# Patient Record
Sex: MALE | Race: WHITE | URBAN - METROPOLITAN AREA
[De-identification: names, ages, dates, MRNs, and addresses within clinical notes are randomized per-mention and may not be internally consistent; named-entity substitution may affect disease eponyms.]

---

## 2017-10-09 ENCOUNTER — OFFICE VISIT (OUTPATIENT)
Dept: DERMATOLOGY | Age: 62
End: 2017-10-09

## 2017-10-09 DIAGNOSIS — D22.9 MULTIPLE NEVI: Primary | ICD-10-CM

## 2017-10-09 DIAGNOSIS — L57.0 AK (ACTINIC KERATOSIS): ICD-10-CM

## 2017-10-09 PROCEDURE — 99202 OFFICE O/P NEW SF 15 MIN: CPT | Performed by: DERMATOLOGY

## 2017-10-09 PROCEDURE — 17000 DESTRUCT PREMALG LESION: CPT | Performed by: DERMATOLOGY

## 2017-10-09 RX ORDER — HYDROCHLOROTHIAZIDE 25 MG/1
TABLET ORAL
Refills: 1 | COMMUNITY
Start: 2017-10-05

## 2017-10-09 RX ORDER — ATORVASTATIN CALCIUM 10 MG/1
TABLET, FILM COATED ORAL
COMMUNITY
Start: 2017-09-05

## 2017-10-09 RX ORDER — TESTOSTERONE GEL, 1% 10 MG/G
GEL TRANSDERMAL
COMMUNITY
Start: 2017-07-18

## 2017-10-09 RX ORDER — CLONAZEPAM 0.5 MG/1
TABLET ORAL
Refills: 5 | COMMUNITY
Start: 2017-08-20

## 2017-10-09 RX ORDER — ASPIRIN 81 MG/1
81 TABLET ORAL
COMMUNITY
End: 2019-10-22

## 2017-10-09 RX ORDER — AMLODIPINE AND OLMESARTAN MEDOXOMIL 5; 40 MG/1; MG/1
1 TABLET ORAL
COMMUNITY

## 2017-10-09 NOTE — PATIENT INSTRUCTIONS
Protecting Yourself From the Sun    · Apply broad spectrum water resistant sunscreen with an SPF of at least 30 to exposed areas of the skin. Dont forget the ears and lips! Remember to reapply sunscreen about every 2 hours and after swimming or sweating. · Wear sun protective clothing. Swim shirts (aka. rash guards) are a great idea and negates the need to reapply sunscreen in those areas. · Seek the shade whenever possible especially between the hours of 10 am and 4 pm when the suns rays are the strongest.     · Avoid tanning beds        Cryosurgery (Freezing) Wound Care Instructions    AFTER THE PROCEDURE:    You will notice swelling and redness around the site. This is normal.    You may experience a sharp or sore feeling for the next several days. For this discomfort, you may take acetaminophen (Tylenol©).  A blister may develop at the treated area, sometimes as soon as by the end of the day. After several days, the blister will subside and a scab will form.  If the area is bumped or traumatized during the first few days following freezing, you may develop bleeding into the blister, forming a blood blister. This is nothing to be alarmed about.  If the blister is tense, uncomfortable, or much larger than the site that was frozen, you may pop the blister along its edge with a sterile needle (boiled, heated under a flame, or cleaned with alcohol) to allow the fluid to drain out. If the blister does not bother you, no treatment is needed.  Do NOT peel off the top of the blister roof. It will act as a dressing on top of your wound. WOUND CARE:    You may shower or bathe as usual, but avoid scrubbing the areas that have been frozen.  Cleanse the site twice a day with mild soapy water, and then apply a thin film of white petrolatum (Vaseline©).  You do not need to cover the area, but can if you prefer.     Do NOT allow the site to become dry or crusted, or attempt to dry it out with rubbing alcohol or hydrogen peroxide.  Continue this regimen until the area is pink and healed. Depending on the size and location of your cryosurgery site, healing may take 2 to 4 weeks.  The area may continue to be pink for several weeks, and over the next few months may become darker or lighter than the surrounding skin. This may be a permanent change.

## 2018-10-16 ENCOUNTER — OFFICE VISIT (OUTPATIENT)
Dept: DERMATOLOGY | Age: 63
End: 2018-10-16
Payer: COMMERCIAL

## 2018-10-16 DIAGNOSIS — L57.0 AK (ACTINIC KERATOSIS): ICD-10-CM

## 2018-10-16 DIAGNOSIS — D22.9 MULTIPLE NEVI: Primary | ICD-10-CM

## 2018-10-16 PROCEDURE — 99213 OFFICE O/P EST LOW 20 MIN: CPT | Performed by: DERMATOLOGY

## 2018-10-16 PROCEDURE — 17000 DESTRUCT PREMALG LESION: CPT | Performed by: DERMATOLOGY

## 2018-10-16 PROCEDURE — 3017F COLORECTAL CA SCREEN DOC REV: CPT | Performed by: DERMATOLOGY

## 2018-10-16 PROCEDURE — G8484 FLU IMMUNIZE NO ADMIN: HCPCS | Performed by: DERMATOLOGY

## 2018-10-16 PROCEDURE — G8421 BMI NOT CALCULATED: HCPCS | Performed by: DERMATOLOGY

## 2018-10-16 PROCEDURE — G8427 DOCREV CUR MEDS BY ELIG CLIN: HCPCS | Performed by: DERMATOLOGY

## 2018-10-16 PROCEDURE — 1036F TOBACCO NON-USER: CPT | Performed by: DERMATOLOGY

## 2018-10-16 RX ORDER — GABAPENTIN 300 MG/1
300 CAPSULE ORAL 3 TIMES DAILY
COMMUNITY

## 2019-10-22 ENCOUNTER — OFFICE VISIT (OUTPATIENT)
Dept: DERMATOLOGY | Age: 64
End: 2019-10-22
Payer: MEDICARE

## 2019-10-22 DIAGNOSIS — L57.0 AK (ACTINIC KERATOSIS): ICD-10-CM

## 2019-10-22 DIAGNOSIS — D22.9 MULTIPLE NEVI: Primary | ICD-10-CM

## 2019-10-22 PROCEDURE — 17000 DESTRUCT PREMALG LESION: CPT | Performed by: DERMATOLOGY

## 2019-10-22 PROCEDURE — 1036F TOBACCO NON-USER: CPT | Performed by: DERMATOLOGY

## 2019-10-22 PROCEDURE — 99213 OFFICE O/P EST LOW 20 MIN: CPT | Performed by: DERMATOLOGY

## 2019-10-22 PROCEDURE — G8427 DOCREV CUR MEDS BY ELIG CLIN: HCPCS | Performed by: DERMATOLOGY

## 2019-10-22 PROCEDURE — 1123F ACP DISCUSS/DSCN MKR DOCD: CPT | Performed by: DERMATOLOGY

## 2019-10-22 PROCEDURE — 4040F PNEUMOC VAC/ADMIN/RCVD: CPT | Performed by: DERMATOLOGY

## 2019-10-22 PROCEDURE — G8421 BMI NOT CALCULATED: HCPCS | Performed by: DERMATOLOGY

## 2019-10-22 PROCEDURE — 3017F COLORECTAL CA SCREEN DOC REV: CPT | Performed by: DERMATOLOGY

## 2019-10-22 PROCEDURE — G8484 FLU IMMUNIZE NO ADMIN: HCPCS | Performed by: DERMATOLOGY

## 2019-10-22 RX ORDER — AMPICILLIN TRIHYDRATE 250 MG
CAPSULE ORAL
COMMUNITY

## 2019-10-22 RX ORDER — ACETAMINOPHEN 160 MG
TABLET,DISINTEGRATING ORAL
COMMUNITY

## 2019-10-22 RX ORDER — MAGNESIUM CITRATE
150 SOLUTION, ORAL ORAL ONCE
COMMUNITY

## 2020-10-27 ENCOUNTER — OFFICE VISIT (OUTPATIENT)
Dept: DERMATOLOGY | Age: 65
End: 2020-10-27
Payer: MEDICARE

## 2020-10-27 VITALS — TEMPERATURE: 96.8 F

## 2020-10-27 PROCEDURE — G8484 FLU IMMUNIZE NO ADMIN: HCPCS | Performed by: DERMATOLOGY

## 2020-10-27 PROCEDURE — 1036F TOBACCO NON-USER: CPT | Performed by: DERMATOLOGY

## 2020-10-27 PROCEDURE — 11102 TANGNTL BX SKIN SINGLE LES: CPT | Performed by: DERMATOLOGY

## 2020-10-27 PROCEDURE — 99213 OFFICE O/P EST LOW 20 MIN: CPT | Performed by: DERMATOLOGY

## 2020-10-27 PROCEDURE — 1123F ACP DISCUSS/DSCN MKR DOCD: CPT | Performed by: DERMATOLOGY

## 2020-10-27 PROCEDURE — G8427 DOCREV CUR MEDS BY ELIG CLIN: HCPCS | Performed by: DERMATOLOGY

## 2020-10-27 PROCEDURE — 4040F PNEUMOC VAC/ADMIN/RCVD: CPT | Performed by: DERMATOLOGY

## 2020-10-27 PROCEDURE — 17000 DESTRUCT PREMALG LESION: CPT | Performed by: DERMATOLOGY

## 2020-10-27 PROCEDURE — 3017F COLORECTAL CA SCREEN DOC REV: CPT | Performed by: DERMATOLOGY

## 2020-10-27 PROCEDURE — 17003 DESTRUCT PREMALG LES 2-14: CPT | Performed by: DERMATOLOGY

## 2020-10-27 PROCEDURE — G8421 BMI NOT CALCULATED: HCPCS | Performed by: DERMATOLOGY

## 2020-10-27 RX ORDER — ASCORBIC ACID 500 MG
500 TABLET ORAL DAILY
COMMUNITY

## 2020-10-27 NOTE — PATIENT INSTRUCTIONS
Biopsy Wound Care Instructions    · Keep the bandage in place for 24 hours. · Cleanse the wound with mild soapy water daily   Gently dry the area.  Apply Vaseline or petroleum jelly to the wound using a cotton tipped applicator.  Cover with a clean bandage.  Repeat this process until the biopsy site is healed.  If you had stitches placed, continue treating the site until the stitches are removed. Remember to make an appointment to return to have your stitches removed by our staff.  You may shower and bathe as usual.       ** Biopsy results generally take around 7 business days to come back. If you have not heard from us by then, please call the office at [phone] between 8AM and 4PM Monday through Friday. Cryosurgery (Freezing) Wound Care Instructions    AFTER THE PROCEDURE:    You will notice swelling and redness around the site. This is normal.    You may experience a sharp or sore feeling for the next several days. For this discomfort, you may take acetaminophen (Tylenol©).  A blister may develop at the treated area, sometimes as soon as by the end of the day. After several days, the blister will subside and a scab will form.  If the area is bumped or traumatized during the first few days following freezing, you may develop bleeding into the blister, forming a blood blister. This is nothing to be alarmed about.  If the blister is tense, uncomfortable, or much larger than the site that was frozen, you may pop the blister along its edge with a sterile needle (boiled, heated under a flame, or cleaned with alcohol) to allow the fluid to drain out. If the blister does not bother you, no treatment is needed.  Do NOT peel off the top of the blister roof. It will act as a dressing on top of your wound. WOUND CARE:    You may shower or bathe as usual, but avoid scrubbing the areas that have been frozen.      Cleanse the site twice a day with mild soapy water, and then apply a thin film of white petrolatum (Vaseline©).  You do not need to cover the area, but can if you prefer.  Do NOT allow the site to become dry or crusted, or attempt to dry it out with rubbing alcohol or hydrogen peroxide.  Continue this regimen until the area is pink and healed. Depending on the size and location of your cryosurgery site, healing may take 2 to 4 weeks.  The area may continue to be pink for several weeks, and over the next few months may become darker or lighter than the surrounding skin. This may be a permanent change.    

## 2020-10-27 NOTE — PROGRESS NOTES
[facility]ermat[facility]  Srikanth Rutherford MD  [phone]      Arleen Yeny  1955    75 y.o. male     Date of Visit: 10/27/2020    Last seen: ~1 year ago    Chief Complaint: lesions  Chief Complaint   Patient presents with    Skin Lesion     FSE     HX: multi nevi     History of Present Illness:    Here for evaluation of multiple asx pigmented lesions on the trunk and extremities, present for many years; no change in size/shape/color of any lesions; no bleeding lesions. Hx of AK - right ear most recently. As with the site. He has one new rough lesion on the scalp and FH and R hand. Asymptomatic. He has a concerning the left flank. His wife had noticed it recently. Asymptomatic    No personal or family hx of skin cancer. Previously was seeing Dr. Norberto Cheadle or Dr. Elvira Hou annually for skin checks. He wears a hat regularly and SPf 30 with golfing. No tanning bed use. He is a . Works with [employer]. Goes to [facility] every spring x 2 mos. Review of Systems:  Gen: Feels well, good sense of health. Skin: No changing moles or lesions. No new rashes. Past Medical History, Family History, Surgical History, Medications and Allergies reviewed. Outpatient Medications Marked as Taking for the 10/27/20 encounter (Office Visit) with Gwendolyn Quiroz MD   Medication Sig Dispense Refill    vitamin C (ASCORBIC ACID) 500 MG tablet Take 500 mg by mouth daily      Coenzyme Q10 (COQ10) 200 MG CAPS Take by mouth      Omega-3 Fatty Acids (FISH OIL) 1360 MG CAPS Take by mouth      Cholecalciferol (VITAMIN D3) 2000 units CAPS Take by mouth      magnesium citrate (CITROMA) SOLN Take 150 mLs by mouth once      gabapentin (NEURONTIN) 300 MG capsule Take 300 mg by mouth 3 times daily. Robert Barkley atorvastatin (LIPITOR) 10 MG tablet TAKE ONE TABLET BY MOUTH EVERY NIGHT AT BEDTIME      hydrochlorothiazide (HYDRODIURIL) 25 MG tablet TK 1 T PO D  1    testosterone (ANDROGEL; TESTIM) 50

## 2020-10-29 LAB — DERMATOLOGY PATHOLOGY REPORT: NORMAL

## 2021-09-28 ENCOUNTER — OFFICE VISIT (OUTPATIENT)
Dept: DERMATOLOGY | Age: 66
End: 2021-09-28
Payer: MEDICARE

## 2021-09-28 VITALS — TEMPERATURE: 97.2 F

## 2021-09-28 DIAGNOSIS — L82.1 SEBORRHEIC KERATOSIS: ICD-10-CM

## 2021-09-28 DIAGNOSIS — D22.9 MULTIPLE NEVI: Primary | ICD-10-CM

## 2021-09-28 DIAGNOSIS — L57.0 AK (ACTINIC KERATOSIS): ICD-10-CM

## 2021-09-28 DIAGNOSIS — Z86.018 HISTORY OF DYSPLASTIC NEVUS: ICD-10-CM

## 2021-09-28 PROCEDURE — 1036F TOBACCO NON-USER: CPT | Performed by: DERMATOLOGY

## 2021-09-28 PROCEDURE — 3017F COLORECTAL CA SCREEN DOC REV: CPT | Performed by: DERMATOLOGY

## 2021-09-28 PROCEDURE — 99213 OFFICE O/P EST LOW 20 MIN: CPT | Performed by: DERMATOLOGY

## 2021-09-28 PROCEDURE — 1123F ACP DISCUSS/DSCN MKR DOCD: CPT | Performed by: DERMATOLOGY

## 2021-09-28 PROCEDURE — G8421 BMI NOT CALCULATED: HCPCS | Performed by: DERMATOLOGY

## 2021-09-28 PROCEDURE — 4040F PNEUMOC VAC/ADMIN/RCVD: CPT | Performed by: DERMATOLOGY

## 2021-09-28 PROCEDURE — G8427 DOCREV CUR MEDS BY ELIG CLIN: HCPCS | Performed by: DERMATOLOGY

## 2021-09-28 NOTE — PROGRESS NOTES
[facility]  Fernando Lopez MD  [phone]      Shirley Rowley  1955    77 y.o. male     Date of Visit: 9/28/2021    Last seen: ~1 year ago    Chief Complaint: lesions  Chief Complaint   Patient presents with    Skin Exam     History of Present Illness:    Here for evaluation of multiple asx pigmented lesions on the trunk and extremities, present for many years; no change in size/shape/color of any lesions; no bleeding lesions. Hx of AK - R FH and right ear most recently. No problems noted. No new concerns    Hx Mildly dysplastic nevus - L flank, completely removed with bx . No personal or family hx of skin cancer. Previously was seeing Dr. Rosemary Ace or Dr. Janneth Felder annually for skin checks. He wears a hat regularly and SPf 30 with golfing. No tanning bed use. He is a . Works with [employer]. Goes to [facility] every spring x 2 mos. Review of Systems:  Gen: Feels well, good sense of health. Skin: No changing moles or lesions. No new rashes. Past Medical History, Family History, Surgical History, Medications and Allergies reviewed. Outpatient Medications Marked as Taking for the 9/28/21 encounter (Office Visit) with Mandi Rodgers MD   Medication Sig Dispense Refill    vitamin C (ASCORBIC ACID) 500 MG tablet Take 500 mg by mouth daily      Coenzyme Q10 (COQ10) 200 MG CAPS Take by mouth      Omega-3 Fatty Acids (FISH OIL) 1360 MG CAPS Take by mouth      Cholecalciferol (VITAMIN D3) 2000 units CAPS Take by mouth      magnesium citrate (CITROMA) SOLN Take 150 mLs by mouth once      Tadalafil (CIALIS PO) Take by mouth      gabapentin (NEURONTIN) 300 MG capsule Take 300 mg by mouth 3 times daily. Seven Brow atorvastatin (LIPITOR) 10 MG tablet TAKE ONE TABLET BY MOUTH EVERY NIGHT AT BEDTIME      hydrochlorothiazide (HYDRODIURIL) 25 MG tablet TK 1 T PO D  1    testosterone (ANDROGEL; TESTIM) 50 MG/5GM (1%) GEL 1% gel APPLY ONE PACKET TO SKIN DAILY      clonazePAM (KLONOPIN) 0.5 MG tablet TK 2-3 TS PO QHS  5    amLODIPine-olmesartan (SARAH) 5-40 MG per tablet Take 1 tablet by mouth       No Known Allergies    Past Medical History:   Diagnosis Date    Hyperlipidemia      History reviewed. No pertinent surgical history. Physical Examination     Gen, well-appearing  FSE today     trunk and extremities with multiple dark and medium brown macules and papules   No AK's today  Ears clear  L flank with scar - clear    Assessment and Plan     1. Benign-appearing nevi and SK's  2. Hx Mildly dysplastic nevus - L flank, completely removed with bx , no signs recurrence  3.  AK - clear today  - educ re ABCD's of MM   educ sun protection SPF 30+  encouraged skin check yearly (sooner if indicated), self checks

## 2022-01-07 ENCOUNTER — TELEPHONE (OUTPATIENT)
Dept: DERMATOLOGY | Age: 67
End: 2022-01-07

## 2022-01-07 NOTE — TELEPHONE ENCOUNTER
Patient has area of concern below left eye that Dr Raya Pathak noted at his last visit. Spot has grown and looks like it is full of blood and maybe pus. Patient is requesting a return call to schedule an appt to take it off. Please advise.  Thank you! ( advised pt Dr Roxie Dalton is out of the office until Monday.)

## 2022-01-10 NOTE — TELEPHONE ENCOUNTER
Patient was not available-he was flying back from [place], but patient's wife accepted an appointment for tomorrow for the area to be checked. Per patient's wife, the area on patient's face filled up with blood then ruptured and not aware if the area is painful.

## 2022-01-11 ENCOUNTER — OFFICE VISIT (OUTPATIENT)
Dept: DERMATOLOGY | Age: 67
End: 2022-01-11
Payer: MEDICARE

## 2022-01-11 VITALS — TEMPERATURE: 97.3 F

## 2022-01-11 DIAGNOSIS — L82.0 INFLAMED SEBORRHEIC KERATOSIS: ICD-10-CM

## 2022-01-11 DIAGNOSIS — L53.9 ERYTHEMA: Primary | ICD-10-CM

## 2022-01-11 PROCEDURE — 1036F TOBACCO NON-USER: CPT | Performed by: DERMATOLOGY

## 2022-01-11 PROCEDURE — 17110 DESTRUCTION B9 LES UP TO 14: CPT | Performed by: DERMATOLOGY

## 2022-01-11 PROCEDURE — G8427 DOCREV CUR MEDS BY ELIG CLIN: HCPCS | Performed by: DERMATOLOGY

## 2022-01-11 PROCEDURE — G8484 FLU IMMUNIZE NO ADMIN: HCPCS | Performed by: DERMATOLOGY

## 2022-01-11 PROCEDURE — G8421 BMI NOT CALCULATED: HCPCS | Performed by: DERMATOLOGY

## 2022-01-11 PROCEDURE — 99212 OFFICE O/P EST SF 10 MIN: CPT | Performed by: DERMATOLOGY

## 2022-01-11 PROCEDURE — 1123F ACP DISCUSS/DSCN MKR DOCD: CPT | Performed by: DERMATOLOGY

## 2022-01-11 PROCEDURE — 3017F COLORECTAL CA SCREEN DOC REV: CPT | Performed by: DERMATOLOGY

## 2022-01-11 PROCEDURE — 4040F PNEUMOC VAC/ADMIN/RCVD: CPT | Performed by: DERMATOLOGY

## 2022-01-11 NOTE — PROGRESS NOTES
[facility]  Serafin Betancourt MD  [phone]      Roxanna Siu  1955    74 y.o. male     Date of Visit: 1/11/2022    Last seen: 9-2021    Chief Complaint: lesions  Chief Complaint   Patient presents with    Skin Lesion     spot beneath left eye \"seed wart- was infected\"    Other     removal of wart beneath left eye and right temple     History of Present Illness:    *bought a house in [place]    1. Had a lesion on the L lower eyelid/infraorbital - got inflamed/irritated and swelled in the past week. Drained over the weekend/scabbed off and feeling much better now and not much to see at this point. 2. Few other irritated lesions - L infraorbital area - ISK and R temple - ISK. Hx of AK - R FH and right ear most recently. No problems noted. No new concerns  Hx Mildly dysplastic nevus - L flank, completely removed with bx . No personal or family hx of skin cancer. Previously was seeing Dr. Harriett Barclay or Dr. Frankie Garcia annually for skin checks. He wears a hat regularly and SPf 30 with golfing. No tanning bed use. He is a . Works with [employer]. Goes to [facility] [place] every spring x 2 mos. Review of Systems:  Gen: Feels well, good sense of health. Skin: No changing moles or lesions. No new rashes. Past Medical History, Family History, Surgical History, Medications and Allergies reviewed. Outpatient Medications Marked as Taking for the 1/11/22 encounter (Office Visit) with Chika Kwok MD   Medication Sig Dispense Refill    vitamin C (ASCORBIC ACID) 500 MG tablet Take 500 mg by mouth daily      Coenzyme Q10 (COQ10) 200 MG CAPS Take by mouth      Omega-3 Fatty Acids (FISH OIL) 1360 MG CAPS Take by mouth      Cholecalciferol (VITAMIN D3) 2000 units CAPS Take by mouth      magnesium citrate (CITROMA) SOLN Take 150 mLs by mouth once      gabapentin (NEURONTIN) 300 MG capsule Take 300 mg by mouth 3 times daily. .      atorvastatin (LIPITOR) 10 MG tablet TAKE ONE TABLET BY MOUTH EVERY NIGHT AT BEDTIME      hydrochlorothiazide (HYDRODIURIL) 25 MG tablet TK 1 T PO D  1    testosterone (ANDROGEL; TESTIM) 50 MG/5GM (1%) GEL 1% gel APPLY ONE PACKET TO SKIN DAILY      clonazePAM (KLONOPIN) 0.5 MG tablet TK 2-3 TS PO QHS  5    amLODIPine-olmesartan (SARAH) 5-40 MG per tablet Take 1 tablet by mouth       No Known Allergies    Past Medical History:   Diagnosis Date    Hyperlipidemia      No past surgical history on file. Physical Examination     Gen, well-appearing     L medial lower eyelid with barely visible faint pink macule remaining  R temple and L infraorbital with stuck-on dull-surfaced brown crusted papules    Assessment and Plan     ? ISK vs ruptured follicle - resolved except faint pink macule remaining - L lower medial aspect of eyelid  - reassured regarding current appearance and ed si/sx NMSC to watch for    R temple and L infraorbital - ISK's  - 2 lesion(s) treated with liquid nitrogen x 2 cycles. Patient educated on risk of blister, hypopigmentation/scar and wound care.

## 2022-09-20 ENCOUNTER — OFFICE VISIT (OUTPATIENT)
Dept: DERMATOLOGY | Age: 67
End: 2022-09-20
Payer: MEDICARE

## 2022-09-20 DIAGNOSIS — L57.0 AK (ACTINIC KERATOSIS): ICD-10-CM

## 2022-09-20 DIAGNOSIS — D48.5 NEOPLASM OF UNCERTAIN BEHAVIOR OF SKIN: ICD-10-CM

## 2022-09-20 DIAGNOSIS — D22.9 MULTIPLE NEVI: Primary | ICD-10-CM

## 2022-09-20 DIAGNOSIS — L82.1 SEBORRHEIC KERATOSIS: ICD-10-CM

## 2022-09-20 DIAGNOSIS — Z86.018 HISTORY OF DYSPLASTIC NEVUS: ICD-10-CM

## 2022-09-20 PROCEDURE — G8427 DOCREV CUR MEDS BY ELIG CLIN: HCPCS | Performed by: DERMATOLOGY

## 2022-09-20 PROCEDURE — G8421 BMI NOT CALCULATED: HCPCS | Performed by: DERMATOLOGY

## 2022-09-20 PROCEDURE — 1123F ACP DISCUSS/DSCN MKR DOCD: CPT | Performed by: DERMATOLOGY

## 2022-09-20 PROCEDURE — 99213 OFFICE O/P EST LOW 20 MIN: CPT | Performed by: DERMATOLOGY

## 2022-09-20 PROCEDURE — 1036F TOBACCO NON-USER: CPT | Performed by: DERMATOLOGY

## 2022-09-20 PROCEDURE — 3017F COLORECTAL CA SCREEN DOC REV: CPT | Performed by: DERMATOLOGY

## 2022-09-20 PROCEDURE — 11102 TANGNTL BX SKIN SINGLE LES: CPT | Performed by: DERMATOLOGY

## 2022-09-20 NOTE — PROGRESS NOTES
[facility]  Bere Cotto MD  [phone]      Jordon Gonzalez  1955    74 y.o. male     Date of Visit: 9/20/2022    Last seen: ~1 year ago    Chief Complaint: lesions  Chief Complaint   Patient presents with    Skin Exam     *gall bladder surgery summer 2022    History of Present Illness:    Here for evaluation of multiple asx pigmented lesions on the trunk and extremities, present for many years; no change in size/shape/color of any lesions; no bleeding lesions. Hx of AK - R FH and right ear most recently. No problems noted. No new concerns    Hx Mildly dysplastic nevus - L flank, completely removed with bx . He has one concerning dark brown lesion on he lower back. He was not aware of it. Irritated area on the L lower eyelid in early 2022 - drained spontaneously and resolved. No personal or family hx of skin cancer. Previously was seeing Dr. Mi House or Dr. Deshawn Castillo annually for skin checks. He wears a hat regularly and SPf 30 with golfing. No tanning bed use. He is a . Works with [employer]. Goes to [facility] [place] every spring x 2 mos. Bought a winter home in [place]. Review of Systems:  Gen: Feels well, good sense of health. Skin: No changing moles or lesions. No new rashes. Past Medical History, Family History, Surgical History, Medications and Allergies reviewed. Outpatient Medications Marked as Taking for the 9/20/22 encounter (Office Visit) with Omega Freeman MD   Medication Sig Dispense Refill    vitamin C (ASCORBIC ACID) 500 MG tablet Take 500 mg by mouth daily      Coenzyme Q10 (COQ10) 200 MG CAPS Take by mouth      Omega-3 Fatty Acids (FISH OIL) 1360 MG CAPS Take by mouth      Cholecalciferol (VITAMIN D3) 2000 units CAPS Take by mouth      magnesium citrate (CITROMA) SOLN Take 150 mLs by mouth once      Tadalafil (CIALIS PO) Take by mouth      gabapentin (NEURONTIN) 300 MG capsule Take 300 mg by mouth 3 times daily. Charlotte Lorenzo atorvastatin (LIPITOR) 10 MG tablet TAKE ONE TABLET BY MOUTH EVERY NIGHT AT BEDTIME      hydrochlorothiazide (HYDRODIURIL) 25 MG tablet TK 1 T PO D  1    testosterone (ANDROGEL; TESTIM) 50 MG/5GM (1%) GEL 1% gel APPLY ONE PACKET TO SKIN DAILY      clonazePAM (KLONOPIN) 0.5 MG tablet TK 2-3 TS PO QHS  5    amLODIPine-olmesartan (ASRAH) 5-40 MG per tablet Take 1 tablet by mouth       No Known Allergies    Past Medical History:   Diagnosis Date    Hyperlipidemia      Past Surgical History:   Procedure Laterality Date    CHOLECYSTECTOMY, LAPAROSCOPIC N/A 08/2022       Physical Examination     Gen, well-appearing  FSE today     trunk and extremities with multiple dark and medium brown macules and papules   No AK's today  Ears clear  L flank with scar - clear  L lower back with brown-black macule          Assessment and Plan     1. Benign-appearing nevi and SK's  2. Hx Mildly dysplastic nevus - L flank, completely removed with bx , no signs recurrence  3. AK - clear today  - Monitor for ABCD's of MM and si/sx of NMSC  Continue sun protection - OTC sunscreen with SPF 30-50+ recommended and reviewed usage  Encouraged skin check yearly (sooner if indicated), self checks    4. L lower back - r/o dysplatic nevus  - Shave biopsy performed after verbal consent obtained. Patient educated regarding risk of bleeding, infection, scar and educated on wound care. Skin cleansed with alcohol pad and site anesthetized with lido + epi. Aluminum chloride applied to site for hemostasis. Petrolatum ointment and bandage applied. Specimen bottle labeled with patient information and site and specimen sent to [facility]path. F/u 1 year.

## 2022-09-20 NOTE — PATIENT INSTRUCTIONS

## 2022-09-22 LAB — DERMATOLOGY PATHOLOGY REPORT: NORMAL
